# Patient Record
Sex: MALE | Race: WHITE | Employment: PART TIME | URBAN - METROPOLITAN AREA
[De-identification: names, ages, dates, MRNs, and addresses within clinical notes are randomized per-mention and may not be internally consistent; named-entity substitution may affect disease eponyms.]

---

## 2021-06-01 ENCOUNTER — HOSPITAL ENCOUNTER (EMERGENCY)
Age: 31
Discharge: HOME | End: 2021-06-01
Attending: EMERGENCY MEDICINE
Payer: COMMERCIAL

## 2021-06-01 VITALS
SYSTOLIC BLOOD PRESSURE: 136 MMHG | DIASTOLIC BLOOD PRESSURE: 86 MMHG | HEART RATE: 73 BPM | RESPIRATION RATE: 18 BRPM | BODY MASS INDEX: 29.52 KG/M2 | TEMPERATURE: 97.9 F | HEIGHT: 74 IN | OXYGEN SATURATION: 98 % | WEIGHT: 230 LBS

## 2021-06-01 DIAGNOSIS — M54.41 ACUTE RIGHT-SIDED LOW BACK PAIN WITH RIGHT-SIDED SCIATICA: Primary | ICD-10-CM

## 2021-06-01 PROCEDURE — 74011250637 HC RX REV CODE- 250/637: Performed by: EMERGENCY MEDICINE

## 2021-06-01 PROCEDURE — 74011250636 HC RX REV CODE- 250/636: Performed by: EMERGENCY MEDICINE

## 2021-06-01 PROCEDURE — 96372 THER/PROPH/DIAG INJ SC/IM: CPT

## 2021-06-01 PROCEDURE — 99283 EMERGENCY DEPT VISIT LOW MDM: CPT

## 2021-06-01 RX ORDER — OXYCODONE AND ACETAMINOPHEN 5; 325 MG/1; MG/1
1 TABLET ORAL
Qty: 4 TABLET | Refills: 0 | Status: SHIPPED | OUTPATIENT
Start: 2021-06-01 | End: 2021-06-04

## 2021-06-01 RX ORDER — IBUPROFEN 800 MG/1
800 TABLET ORAL EVERY 8 HOURS
Qty: 15 TABLET | Refills: 0 | Status: SHIPPED | OUTPATIENT
Start: 2021-06-01 | End: 2021-06-06

## 2021-06-01 RX ORDER — KETOROLAC TROMETHAMINE 15 MG/ML
15 INJECTION, SOLUTION INTRAMUSCULAR; INTRAVENOUS
Status: COMPLETED | OUTPATIENT
Start: 2021-06-01 | End: 2021-06-01

## 2021-06-01 RX ORDER — OXYCODONE AND ACETAMINOPHEN 5; 325 MG/1; MG/1
1 TABLET ORAL
Status: COMPLETED | OUTPATIENT
Start: 2021-06-01 | End: 2021-06-01

## 2021-06-01 RX ORDER — METAXALONE 800 MG/1
800 TABLET ORAL 4 TIMES DAILY
Qty: 20 TABLET | Refills: 0 | Status: SHIPPED | OUTPATIENT
Start: 2021-06-01 | End: 2021-06-06

## 2021-06-01 RX ADMIN — OXYCODONE HYDROCHLORIDE AND ACETAMINOPHEN 1 TABLET: 5; 325 TABLET ORAL at 12:36

## 2021-06-01 RX ADMIN — KETOROLAC TROMETHAMINE 15 MG: 15 INJECTION, SOLUTION INTRAMUSCULAR; INTRAVENOUS at 12:36

## 2021-06-01 NOTE — PROGRESS NOTES
Ketorolac 15 mg was therapeutically interchanged for Ketorolac 30 mg per the P &T Committee approved Therapeutic Interchanges Policy.

## 2021-06-01 NOTE — ED PROVIDER NOTES
EMERGENCY DEPARTMENT HISTORY AND PHYSICAL EXAM    Date: 6/1/2021  Patient Name: Liza Huntley    History of Presenting Illness     Chief Complaint   Patient presents with    Back Pain         History Provided By: Patient    Additional History (Context): Liza Huntley is a 35 y.o. male with No significant past medical history who presents with lanes of lower right back pain slightly radiating to his right leg. He says that he overexerted himself while cutting the grass yesterday. Denies direct trauma fever rash IVDU saddle anesthesia bowel incontinence urinary retention or unintentional weight loss in the past 6 months. He has had back pain issues previously but none to this degree where he felt it was hard to walk. He called 911. PCP: Dorethia Hammans, MD    Current Facility-Administered Medications   Medication Dose Route Frequency Provider Last Rate Last Admin    ketorolac tromethamine (TORADOL) 60 mg/2 mL injection 30 mg  30 mg IntraMUSCular NOW Micki Oliveira PA        oxyCODONE-acetaminophen (PERCOCET) 5-325 mg per tablet 1 Tablet  1 Tablet Oral NOW Micki Oliveira PA         Current Outpatient Medications   Medication Sig Dispense Refill    metaxalone (Skelaxin) 800 mg tablet Take 1 Tablet by mouth four (4) times daily for 5 days. 20 Tablet 0    ibuprofen (MOTRIN) 800 mg tablet Take 1 Tablet by mouth every eight (8) hours for 5 days. 15 Tablet 0    oxyCODONE-acetaminophen (Percocet) 5-325 mg per tablet Take 1 Tablet by mouth every six (6) hours as needed for Pain for up to 3 days. Max Daily Amount: 4 Tablets. 4 Tablet 0    trimethoprim-sulfamethoxazole (BACTRIM DS, SEPTRA DS) 160-800 mg per tablet Take 1 Tab by mouth two (2) times a day. 14 Tab 0    sertraline (ZOLOFT) 100 mg tablet Take 200 mg by mouth daily.          Past History     Past Medical History:  Past Medical History:   Diagnosis Date    ADD (attention deficit disorder with hyperactivity)     OCD (obsessive compulsive disorder)     Proteinuria        Past Surgical History:  Past Surgical History:   Procedure Laterality Date    HX WISDOM TEETH EXTRACTION  2009       Family History:  Family History   Problem Relation Age of Onset    Other Father         PVCs   [employer] Sister     Neuropathy Father        Social History:  Social History     Tobacco Use    Smoking status: Never Smoker    Smokeless tobacco: Never Used   Substance Use Topics    Alcohol use: Yes     Comment: once a month    Drug use: No       Allergies:  No Known Allergies      Review of Systems   Review of Systems   Constitutional: Negative for fever and unexpected weight change. Gastrointestinal: Negative for abdominal pain. Musculoskeletal: Positive for back pain and gait problem. Skin: Negative for rash. Neurological: Negative for weakness and numbness. All Other Systems Negative  Physical Exam     Vitals:    06/01/21 1118 06/01/21 1125   BP: 136/86    Pulse: 73    Resp: 18    Temp: 97.9 °F (36.6 °C)    SpO2: 98% 98%   Weight: 104.3 kg (230 lb)    Height: 6' 2\" (1.88 m)      Physical Exam  Vitals and nursing note reviewed. Constitutional:       General: He is not in acute distress. Appearance: He is well-developed. He is not ill-appearing, toxic-appearing or diaphoretic. HENT:      Head: Normocephalic and atraumatic. Neck:      Thyroid: No thyromegaly. Vascular: No carotid bruit. Trachea: No tracheal deviation. Cardiovascular:      Rate and Rhythm: Normal rate and regular rhythm. Heart sounds: Normal heart sounds. No murmur heard. No friction rub. No gallop. Pulmonary:      Effort: Pulmonary effort is normal. No respiratory distress. Breath sounds: Normal breath sounds. No stridor. No wheezing or rales. Chest:      Chest wall: No tenderness. Abdominal:      General: There is no distension. Palpations: Abdomen is soft. There is no mass. Tenderness: There is no abdominal tenderness.  There is no guarding or rebound. Comments: No pulsatile mass palpated. Musculoskeletal:         General: Tenderness present. Normal range of motion. Cervical back: Normal range of motion and neck supple. Comments: No midline thoracolumbar spine TTP. Negative SLE. Diffuse right latissimus dorsi distribution TTP. Skin:     General: Skin is warm and dry. Coloration: Skin is not pale. Neurological:      Mental Status: He is alert. Psychiatric:         Speech: Speech normal.         Behavior: Behavior normal.         Thought Content: Thought content normal.         Judgment: Judgment normal.              Diagnostic Study Results     Labs -   No results found for this or any previous visit (from the past 12 hour(s)). Radiologic Studies -   No orders to display     CT Results  (Last 48 hours)    None        CXR Results  (Last 48 hours)    None            Medical Decision Making   I am the first provider for this patient. I reviewed the vital signs, available nursing notes, past medical history, past surgical history, family history and social history. Vital Signs-Reviewed the patient's vital signs. Procedures:  Procedures    Provider Notes (Medical Decision Making): Treat his low back strain ambulate and discharged with PCP follow-up. MED RECONCILIATION:  Current Facility-Administered Medications   Medication Dose Route Frequency    ketorolac tromethamine (TORADOL) 60 mg/2 mL injection 30 mg  30 mg IntraMUSCular NOW    oxyCODONE-acetaminophen (PERCOCET) 5-325 mg per tablet 1 Tablet  1 Tablet Oral NOW     Current Outpatient Medications   Medication Sig    metaxalone (Skelaxin) 800 mg tablet Take 1 Tablet by mouth four (4) times daily for 5 days.  ibuprofen (MOTRIN) 800 mg tablet Take 1 Tablet by mouth every eight (8) hours for 5 days.  oxyCODONE-acetaminophen (Percocet) 5-325 mg per tablet Take 1 Tablet by mouth every six (6) hours as needed for Pain for up to 3 days.  Max Daily Amount: 4 Tablets.  trimethoprim-sulfamethoxazole (BACTRIM DS, SEPTRA DS) 160-800 mg per tablet Take 1 Tab by mouth two (2) times a day.  sertraline (ZOLOFT) 100 mg tablet Take 200 mg by mouth daily. Disposition:  home    DISCHARGE NOTE:   12:36 PM    Pt has been reexamined. Patient has no new complaints, changes, or physical findings. Care plan outlined and precautions discussed. Results of exam were reviewed with the patient. All medications were reviewed with the patient; will d/c home with see below. All of pt's questions and concerns were addressed. Patient was instructed and agrees to follow up with PCP, as well as to return to the ED upon further deterioration. Patient is ready to go home. Follow-up Information     Follow up With Specialties Details Why Contact Info    Griffin Trujillo MD [facility] Medicine Schedule an appointment as soon as possible for a visit today  [address]  [address] [place] [facility] EMERGENCY DEPT Emergency Medicine  If symptoms worsen return immediately [address]  [phone]          Current Discharge Medication List      START taking these medications    Details   metaxalone (Skelaxin) 800 mg tablet Take 1 Tablet by mouth four (4) times daily for 5 days. Qty: 20 Tablet, Refills: 0  Start date: 6/1/2021, End date: 6/6/2021      ibuprofen (MOTRIN) 800 mg tablet Take 1 Tablet by mouth every eight (8) hours for 5 days. Qty: 15 Tablet, Refills: 0  Start date: 6/1/2021, End date: 6/6/2021      oxyCODONE-acetaminophen (Percocet) 5-325 mg per tablet Take 1 Tablet by mouth every six (6) hours as needed for Pain for up to 3 days. Max Daily Amount: 4 Tablets. Qty: 4 Tablet, Refills: 0  Start date: 6/1/2021, End date: 6/4/2021    Associated Diagnoses: Acute right-sided low back pain with right-sided sciatica               Diagnosis     Clinical Impression:   1.  Acute right-sided low back pain with right-sided sciatica

## 2025-02-28 ENCOUNTER — TELEPHONE (OUTPATIENT)
Facility: HOSPITAL | Age: 35
End: 2025-02-28

## 2025-02-28 NOTE — TELEPHONE ENCOUNTER
PT WAS RETURNING CALL TO SCHEDULE EVAL APPT, SCHEDULED EVAL APPT 3/12 430 INFORMED PT TO ARRIVE BY 4

## 2025-03-12 ENCOUNTER — HOSPITAL ENCOUNTER (OUTPATIENT)
Facility: HOSPITAL | Age: 35
Setting detail: RECURRING SERIES
Discharge: HOME | End: 2025-03-15
Payer: COMMERCIAL

## 2025-03-12 PROCEDURE — 97110 THERAPEUTIC EXERCISES: CPT

## 2025-03-12 PROCEDURE — 97162 PT EVAL MOD COMPLEX 30 MIN: CPT

## 2025-03-12 PROCEDURE — 97535 SELF CARE MNGMENT TRAINING: CPT

## 2025-03-12 NOTE — PROGRESS NOTES
[facility]  [address] [facility] [address] [place] [address] Ph:[phone] Fx: [phone]    PLAN OF CARE/ Statement of Necessity for Physical Therapy Services           Patient name: Satya Ruvalcaba Start of Care: 3/12/2025   Referral source: Gabe Ortiz MD : 1990    Medical Diagnosis: Other low back pain [M54.59]       Onset Date: 12/15/2024   Treatment Diagnosis: M54.41  LUMBAGO WITH SCIATICA, RIGHT SIDE                                     Prior Hospitalization: see medical history Provider#: [id number]     Comorbidities: Fatty liver disease    Prior Level of Function: The patient reports that he was able to walk faster, sit for greater periods of time, and perform ADLs without as much limitation.    The Plan of Care and following information is based on the information from the initial evaluation.    Assessment / key information:  The patient is a 34 year old male with a chief complaint of low back pain that began 12/15/2024 when he had severe low back pain which gradually got better, but has turned into right sciatica pain which continues to this day. He describes most pain in sitting and states that walking fast is difficulty. The patient had radiographs which were negative, and has an upcoming MRI. Per exam, the patient has + slump, + SLR, 0+ achilles reflex right, 2+ achilles left, 0/5 MMT plantarflexion strength right, 5/5 MMT left, and weakness of glute med/max greater on his right than left. The patient appears to respond to repeated extension patterns and has signs and symptoms that appear consistent with an S1 radiculopathy with probable discogenic origin. He has impairments consisting of pain, decreased ROM, decreased flexibility, decreased strength, limited ADL ease, and decreased quality of life. The patient will benefit from skilled PT in order to address the aforementioned impairments.      Evaluation Complexity:  History:  MEDIUM

## 2025-03-12 NOTE — PROGRESS NOTES
PHYSICAL / OCCUPATIONAL THERAPY - DAILY TREATMENT NOTE     Patient Name: Satya Ruvalcaba    Date: 3/12/2025    : 1990  Insurance[place] / Product Type: *No Product type* /      Patient  verified Yes     Visit #   Current / Total 1 24   Time   In / Out 4:30 5:10   Pain   In / Out 2/10 1-2/10   Subjective Functional Status/Changes: See IE   Changes to:  Allergies, Med Hx, Sx Hx?   no       TREATMENT AREA =  Other low back pain [M54.59]    If an interpreting service is utilized for treatment of this patient, the contents of this document represent the material reviewed with the patient via the .     OBJECTIVE  Therapeutic Procedures:  Tx Min Billable or 1:1 Min (if diff from Tx Min) Procedure, Rationale, Specifics   15  83822 Therapeutic Exercise (timed):  increase ROM, strength, coordination, balance, and proprioception to improve patient's ability to progress to PLOF and address remaining functional goals. (see flow sheet as applicable)    Details if applicable:       8  00113 Self Care/Home Management (timed):  improve patient knowledge and understanding of home injury/symptom/pain management, positioning, posture/ergonomics, home safety, activity modification, transfer techniques, and joint protection strategies  to improve patient's ability to progress to PLOF and address remaining functional goals.  (see flow sheet as applicable)    Details if applicable:  education regarding related anatomy, education concerning there-ex rationale and how this pertains to discogenic patterns, seated posture with towel roll education.   23  [facility] Totals Reminder: bill using total billable min of TIMED therapeutic procedures (example: do not include dry needle or estim unattended, both untimed codes, in totals to left)  8-22 min = 1 unit; 23-37 min = 2 units; 38-52 min = 3 units; 53-67 min = 4 units; 68-82 min = 5 units   Total Total     TOTAL TREATMENT TIME:        23     [x]

## 2025-03-25 ENCOUNTER — HOSPITAL ENCOUNTER (OUTPATIENT)
Facility: HOSPITAL | Age: 35
Setting detail: RECURRING SERIES
Discharge: HOME | End: 2025-03-28
Payer: COMMERCIAL

## 2025-03-25 PROCEDURE — 97112 NEUROMUSCULAR REEDUCATION: CPT

## 2025-03-25 PROCEDURE — 97110 THERAPEUTIC EXERCISES: CPT

## 2025-03-25 PROCEDURE — 97012 MECHANICAL TRACTION THERAPY: CPT

## 2025-03-25 PROCEDURE — G0283 ELEC STIM OTHER THAN WOUND: HCPCS

## 2025-04-01 ENCOUNTER — HOSPITAL ENCOUNTER (OUTPATIENT)
Facility: HOSPITAL | Age: 35
Setting detail: RECURRING SERIES
Discharge: HOME | End: 2025-04-04
Payer: COMMERCIAL

## 2025-04-01 PROCEDURE — 97110 THERAPEUTIC EXERCISES: CPT

## 2025-04-01 PROCEDURE — 97012 MECHANICAL TRACTION THERAPY: CPT

## 2025-04-01 PROCEDURE — 97112 NEUROMUSCULAR REEDUCATION: CPT

## 2025-04-01 PROCEDURE — G0283 ELEC STIM OTHER THAN WOUND: HCPCS

## 2025-04-01 NOTE — PROGRESS NOTES
PHYSICAL / OCCUPATIONAL THERAPY - DAILY TREATMENT NOTE     Patient Name: Satya Ruvalcaba    Date: 2025    : 1990  [place]urance: [place] / Product Type: *No Product type* /      Patient  verified Yes     Visit #   Current / Total 3 24   Time   In / Out 5:51 6:43   Pain   In / Out 4/10 2/10   Subjective Functional Status/Changes: Reports feeling his Right LE was stiffer after previous treatment than his back.   Changes to:  Allergies, Med Hx, Sx Hx?   no       TREATMENT AREA =  Lumbago with sciatica, right side [M54.41]    If an interpreting service is utilized for treatment of this patient, the contents of this document represent the material reviewed with the patient via the .     OBJECTIVE    Modalities Rationale:     decrease pain, increase tissue extensibility, and increase muscle contraction/control to improve patient's ability to progress to PLOF and address remaining functional goals.    10 min [x] Estim Unattended, type/location: Kenyan 4\"/12\" Right gastroc - Pt prone performing ankle PF.                                     []  w/ice    []  w/heat    min [] Estim Attended, type/location:                                     []  w/US     []  w/ice    []  w/heat    []  TENS insruct     10 min [x]  Mechanical Traction: type/lbs L/S - 75#                   [x]  pro   []  sup   []  int   [x]  cont    []  before manual    []  after manual    min []  Ultrasound, settings/location:      min []  Iontophoresis w/ dexamethasone, location:                                               []  take home patch       []  in clinic        min  unbilled []  Ice     []  Heat    location/position:     min []  Paraffin,  details:     min []  Vasopneumatic Device, press/temp:     min []  Whirlpool / Fluido:    If using vaso (only need to measure limb vaso being performed on)      pre-treatment girth :       post-treatment girth :       measured at (landmark location) :      min []  Other:

## 2025-04-03 ENCOUNTER — HOSPITAL ENCOUNTER (OUTPATIENT)
Facility: HOSPITAL | Age: 35
Setting detail: RECURRING SERIES
Discharge: HOME | End: 2025-04-06
Payer: COMMERCIAL

## 2025-04-03 PROCEDURE — 97112 NEUROMUSCULAR REEDUCATION: CPT

## 2025-04-03 PROCEDURE — G0283 ELEC STIM OTHER THAN WOUND: HCPCS

## 2025-04-03 PROCEDURE — 97012 MECHANICAL TRACTION THERAPY: CPT

## 2025-04-03 PROCEDURE — 97110 THERAPEUTIC EXERCISES: CPT

## 2025-04-03 NOTE — PROGRESS NOTES
PHYSICAL / OCCUPATIONAL THERAPY - DAILY TREATMENT NOTE     Patient Name: Satya Ruvalcaba    Date: 4/3/2025    : 1990  [place]urance: [place] / Product Type: *No Product type* /      Patient  verified Yes     Visit #   Current / Total 4 24   Time   In / Out 3:10 4:00   Pain   In / Out 1/10 1/10   Subjective Functional Status/Changes: Reports less pain today.   Changes to:  Allergies, Med Hx, Sx Hx?   no       TREATMENT AREA =  Lumbago with sciatica, right side [M54.41]    If an interpreting service is utilized for treatment of this patient, the contents of this document represent the material reviewed with the patient via the .     OBJECTIVE    Modalities Rationale:     decrease pain, increase tissue extensibility, and increase muscle contraction/control to improve patient's ability to progress to PLOF and address remaining functional goals.    8 min [x] Estim Unattended, type/location: Japanese 5\"/5\" Right gastroc - Pt prone performing ankle PF.                                      []  w/ice    []  w/heat    min [] Estim Attended, type/location:                                     []  w/US     []  w/ice    []  w/heat    []  TENS insruct     10 min [x]  Mechanical Traction: type/lbs L/S - 75#                   [x]  pro   []  sup   []  int   [x]  cont    []  before manual    []  after manual    min []  Ultrasound, settings/location:      min []  Iontophoresis w/ dexamethasone, location:                                               []  take home patch       []  in clinic        min  unbilled []  Ice     []  Heat    location/position:     min []  Paraffin,  details:     min []  Vasopneumatic Device, press/temp:     min []  Whirlpool / Fluido:    If using vaso (only need to measure limb vaso being performed on)      pre-treatment girth :       post-treatment girth :       measured at (landmark location) :      min []  Other:    Skin assessment post-treatment (if applicable):    []

## 2025-04-08 ENCOUNTER — TELEPHONE (OUTPATIENT)
Facility: HOSPITAL | Age: 35
End: 2025-04-08

## 2025-04-08 ENCOUNTER — HOSPITAL ENCOUNTER (OUTPATIENT)
Facility: HOSPITAL | Age: 35
Setting detail: RECURRING SERIES
End: 2025-04-08
Payer: COMMERCIAL

## 2025-04-10 ENCOUNTER — HOSPITAL ENCOUNTER (OUTPATIENT)
Facility: HOSPITAL | Age: 35
Setting detail: RECURRING SERIES
Discharge: HOME | End: 2025-04-13
Payer: COMMERCIAL

## 2025-04-10 PROCEDURE — G0283 ELEC STIM OTHER THAN WOUND: HCPCS

## 2025-04-10 PROCEDURE — 97012 MECHANICAL TRACTION THERAPY: CPT

## 2025-04-10 PROCEDURE — 97112 NEUROMUSCULAR REEDUCATION: CPT

## 2025-04-10 PROCEDURE — 97110 THERAPEUTIC EXERCISES: CPT

## 2025-04-10 NOTE — PROGRESS NOTES
[facility]  [address] [place] [address] - Ph: [phone]   Fx: [phone]    PHYSICAL THERAPY PROGRESS NOTE      Patient name: Satya Ruvalcaba Start of Care: 3/12/2025   Referral source: Gabe Ortiz MD : 1990    Medical Diagnosis: Other low back pain  Sciatica, right side  [place] / [place] / Product Type: *No Product type* /  Onset Date:12/15/2024    Treatment Diagnosis: M54.41  LUMBAGO WITH SCIATICA, RIGHT SIDE     Prior Hospitalization: see medical history Provider#: [id number]   Comorbidities: Fatty liver disease   Prior Level of Function:The patient reports that he was able to walk faster, sit for greater periods of time, and perform ADLs without as much limitation.     Reporting Period: 3/12/2025-4/10/2025  Visits from Start of Care: 5    Missed Visits: 1    Goals/Measure of Progress: To be achieved in 24 treatments:    Short Term Goals: To be accomplished in 2 weeks  The patient will demonstrate independence and compliance with HEP to maximize therapeutic benefit.              IE: issued HEP              PN 3/25/2025: Met, pt reports compliance.  The patient will demonstrate 90/90 flexibility of right to 30 degrees to improve ease of ambulation.              IE: 62 degrees right              PN 2025: Progressing, lacking 50 degrees.     Long Term Goals: To be accomplished in 12 weeks  The patient will improve Oswestry score to < 10% disability to improve ease of ADLs.              IE: 34%              PN 4/10/2025: 18%  The patient will demonstrate plantarflexion strength of right to 4/5 MMT to improve ease of ambulation efficiency.              IE: 0/5 MMT              PN 4/3/2025: Progressing, 3-/5.  The patient will demonstrate hip ABD/EXT strength to 5/5 MMT right hip to improve stability in stance.              IE: 4-/5 MMT glute med, and max              PN 4/10/2025: MMT Right hip abd 4/5, ext

## 2025-04-10 NOTE — PROGRESS NOTES
PHYSICAL / OCCUPATIONAL THERAPY - DAILY TREATMENT NOTE     Patient Name: Satya Ruvalcaba    Date: 4/10/2025    : 1990  Insurance: [place] / [place] / Product Type: *No Product type* /      Patient  verified Yes     Visit #   Current / Total 5 24   Time   In / Out 3:46 4:42   Pain   In / Out 1/10 1/10   Subjective Functional Status/Changes: \"Doing well overall.\"   Changes to:  Allergies, Med Hx, Sx Hx?   no       TREATMENT AREA =  Lumbago with sciatica, right side [M54.41]    If an interpreting service is utilized for treatment of this patient, the contents of this document represent the material reviewed with the patient via the .     OBJECTIVE    Modalities Rationale:     decrease pain, increase tissue extensibility, and increase muscle contraction/control to improve patient's ability to progress to PLOF and address remaining functional goals.    8 min [x] Estim Unattended, type/location: Albanian 5\"/5\" Right gastroc - Pt prone performing ankle PF.                                      []  w/ice    []  w/heat    min [] Estim Attended, type/location:                                     []  w/US     []  w/ice    []  w/heat    []  TENS insruct     10 min [x]  Mechanical Traction: type/lbs L/S - 75#                   [x]  pro   []  sup   []  int   [x]  cont    []  before manual    []  after manual    min []  Ultrasound, settings/location:      min []  Iontophoresis w/ dexamethasone, location:                                               []  take home patch       []  in clinic        min  unbilled []  Ice     []  Heat    location/position:     min []  Paraffin,  details:     min []  Vasopneumatic Device, press/temp:     min []  Whirlpool / Fluido:    If using vaso (only need to measure limb vaso being performed on)      pre-treatment girth :       post-treatment girth :       measured at (landmark location) :      min []  Other:    Skin assessment post-treatment (if applicable):    []

## 2025-04-15 ENCOUNTER — TELEPHONE (OUTPATIENT)
Facility: HOSPITAL | Age: 35
End: 2025-04-15

## 2025-04-15 ENCOUNTER — HOSPITAL ENCOUNTER (OUTPATIENT)
Facility: HOSPITAL | Age: 35
Setting detail: RECURRING SERIES
Discharge: HOME | End: 2025-04-18
Payer: COMMERCIAL

## 2025-04-15 PROCEDURE — G0283 ELEC STIM OTHER THAN WOUND: HCPCS

## 2025-04-15 PROCEDURE — 97110 THERAPEUTIC EXERCISES: CPT

## 2025-04-15 PROCEDURE — 97112 NEUROMUSCULAR REEDUCATION: CPT

## 2025-04-15 PROCEDURE — 97012 MECHANICAL TRACTION THERAPY: CPT

## 2025-04-15 NOTE — PROGRESS NOTES
[facility]   5/1/2025  4:30 PM [facility] Richmond, [facility]   5/6/2025  5:50 PM Ganesh Richmond, LUISITO [facility]   5/8/2025  4:30 PM Ganesh Richmond, PTA [facility]   5/13/2025  5:10 PM Ganesh Richmond, [facility]

## 2025-04-17 ENCOUNTER — HOSPITAL ENCOUNTER (OUTPATIENT)
Facility: HOSPITAL | Age: 35
Setting detail: RECURRING SERIES
Discharge: HOME | End: 2025-04-20
Payer: COMMERCIAL

## 2025-04-17 PROCEDURE — 97012 MECHANICAL TRACTION THERAPY: CPT

## 2025-04-17 PROCEDURE — G0283 ELEC STIM OTHER THAN WOUND: HCPCS

## 2025-04-17 PROCEDURE — 97110 THERAPEUTIC EXERCISES: CPT

## 2025-04-17 PROCEDURE — 97112 NEUROMUSCULAR REEDUCATION: CPT

## 2025-04-17 NOTE — PROGRESS NOTES
Other:    Skin assessment post-treatment (if applicable):    []  intact    []  redness- no adverse reaction                 []redness - adverse reaction:         Therapeutic Procedures:  Tx Min Billable or 1:1 Min (if diff from Tx Min) Procedure, Rationale, Specifics   22  23176 Therapeutic Exercise (timed):  increase ROM, strength, coordination, balance, and proprioception to improve patient's ability to progress to PLOF and address remaining functional goals. (see flow sheet as applicable)    Details if applicable:       10  39012 Neuromuscular Re-Education (timed):  improve balance, coordination, kinesthetic sense, posture, core stability and proprioception to improve patient's ability to develop conscious control of individual muscles and awareness of position of extremities in order to progress to PLOF and address remaining functional goals. (see flow sheet as applicable)    Details if applicable:     32  [facility] Totals Reminder: bill using total billable min of TIMED therapeutic procedures (example: do not include dry needle or estim unattended, both untimed codes, in totals to left)  8-22 min = 1 unit; 23-37 min = 2 units; 38-52 min = 3 units; 53-67 min = 4 units; 68-82 min = 5 units   Total Total     TOTAL TREATMENT TIME:        50     Charge Capture    [x]  Patient Education billed concurrently with other procedures   [x] Review HEP    [] Progressed/Changed HEP, detail:    [] Other detail:       Objective Information/Functional Measures/Assessment    MMT Right hip abd 4+/5, ext 4/5. Demonstrates improved exercise tolerance, improved stability with eccentric control. Normal gait with regular speed ambulation, mild Right foot toe off deficits with fast paced walking. Denied pain and sciatic symptoms post-treatment.    Patient will continue to benefit from skilled PT / OT services to modify and progress therapeutic interventions, analyze and address functional mobility deficits, analyze and address ROM

## 2025-04-29 ENCOUNTER — TELEPHONE (OUTPATIENT)
Facility: HOSPITAL | Age: 35
End: 2025-04-29

## 2025-04-29 ENCOUNTER — HOSPITAL ENCOUNTER (OUTPATIENT)
Facility: HOSPITAL | Age: 35
Setting detail: RECURRING SERIES
Discharge: HOME | End: 2025-05-02
Payer: COMMERCIAL

## 2025-04-29 PROCEDURE — 97110 THERAPEUTIC EXERCISES: CPT

## 2025-04-29 PROCEDURE — 97112 NEUROMUSCULAR REEDUCATION: CPT

## 2025-04-29 PROCEDURE — 97012 MECHANICAL TRACTION THERAPY: CPT

## 2025-04-29 NOTE — PROGRESS NOTES
throughout day of HEP.     Patient will continue to benefit from skilled PT / OT services to modify and progress therapeutic interventions, analyze and address functional mobility deficits, analyze and address ROM deficits, analyze and address strength deficits, analyze and address soft tissue restrictions, analyze and cue for proper movement patterns, and analyze and modify for postural abnormalities to address functional deficits and attain remaining goals.    Progress toward goals / Updated goals:  []  See Progress Note/Recertification    Short Term Goals: To be accomplished in 2 weeks  The patient will demonstrate independence and compliance with HEP to maximize therapeutic benefit.              IE: issued HEP              PN 3/25/2025: Met, pt reports compliance.  The patient will demonstrate 90/90 flexibility of right to 30 degrees to improve ease of ambulation.              IE: 62 degrees right              PN 4/1/2025: Progressing, lacking 50 degrees.     Long Term Goals: To be accomplished in 12 weeks  The patient will improve Oswestry score to < 10% disability to improve ease of ADLs.              IE: 34%              PN 4/10/2025: 18%  The patient will demonstrate plantarflexion strength of right to 4/5 MMT to improve ease of ambulation efficiency.              IE: 0/5 MMT              PN 4/29/2025: Progressing, 3+/5.  The patient will demonstrate hip ABD/EXT strength to 5/5 MMT right hip to improve stability in stance.              IE: 4-/5 MMT glute med, and max              PN 4/10/2025: MMT Right hip abd 4/5, ext 4/5.              Current 4/17/2025: MMT Right hip abd 4+/5, ext 4/5.   The patient will report 90% resolution of right leg paresthesias to improve ease of work related tasks.              IE: 0% resolution sciatica              PN 4/10/2025: 60-70% improvement.    PLAN  Yes  Continue plan of care  []  Upgrade activities as tolerated  []  Discharge due to :  []  Other:    Rhoda Mazariegos

## 2025-05-01 ENCOUNTER — APPOINTMENT (OUTPATIENT)
Facility: HOSPITAL | Age: 35
End: 2025-05-01
Payer: COMMERCIAL

## 2025-05-01 ENCOUNTER — TELEPHONE (OUTPATIENT)
Facility: HOSPITAL | Age: 35
End: 2025-05-01

## 2025-05-06 ENCOUNTER — HOSPITAL ENCOUNTER (OUTPATIENT)
Facility: HOSPITAL | Age: 35
Setting detail: RECURRING SERIES
Discharge: HOME | End: 2025-05-09
Payer: COMMERCIAL

## 2025-05-06 ENCOUNTER — TELEPHONE (OUTPATIENT)
Facility: HOSPITAL | Age: 35
End: 2025-05-06

## 2025-05-06 PROCEDURE — 97110 THERAPEUTIC EXERCISES: CPT

## 2025-05-06 PROCEDURE — G0283 ELEC STIM OTHER THAN WOUND: HCPCS

## 2025-05-06 PROCEDURE — 97112 NEUROMUSCULAR REEDUCATION: CPT

## 2025-05-06 PROCEDURE — 97012 MECHANICAL TRACTION THERAPY: CPT

## 2025-05-06 NOTE — PROGRESS NOTES
PHYSICAL / OCCUPATIONAL THERAPY - DAILY TREATMENT NOTE     Patient Name: Satya Ruvalcaba    Date: 2025    : 1990  Insurance: [place] [employer] / [place] [employer] / Product Type: *No Product type* /      Patient  verified Yes     Visit #   Current / Total 9 24   Time   In / Out 5:45 6:44   Pain   In / Out 0/10 0/10   Subjective Functional Status/Changes: Denies pain.   Changes to:  Allergies, Med Hx, Sx Hx?   no       TREATMENT AREA =  Lumbago with sciatica, right side [M54.41]    If an interpreting service is utilized for treatment of this patient, the contents of this document represent the material reviewed with the patient via the .     OBJECTIVE    Modalities Rationale:     decrease pain and increase tissue extensibility to improve patient's ability to progress to PLOF and address remaining functional goals.    8 min [x] Estim Unattended, type/location: [place] 5\"/5\" Right gastroc - Pt prone performing ankle PF.                                      []  w/ice    []  w/heat    min [] Estim Attended, type/location:                                     []  w/US     []  w/ice    []  w/heat    []  TENS insruct     10 min [x]  Mechanical Traction: type/lbs L/S - 75#                   [x]  pro   []  sup   []  int   [x]  cont    []  before manual    []  after manual    min []  Ultrasound, settings/location:      min []  Iontophoresis w/ dexamethasone, location:                                               []  take home patch       []  in clinic        min  unbilled []  Ice     []  Heat    location/position:     min []  Paraffin,  details:     min []  Vasopneumatic Device, press/temp:     min []  Whirlpool / Fluido:    If using vaso (only need to measure limb vaso being performed on)      pre-treatment girth :       post-treatment girth :       measured at (landmark location) :      min []  Other:    Skin assessment post-treatment (if applicable):    []  intact    []  redness- no adverse reaction

## 2025-05-08 ENCOUNTER — HOSPITAL ENCOUNTER (OUTPATIENT)
Facility: HOSPITAL | Age: 35
Setting detail: RECURRING SERIES
Discharge: HOME | End: 2025-05-11
Payer: COMMERCIAL

## 2025-05-08 PROCEDURE — 97110 THERAPEUTIC EXERCISES: CPT

## 2025-05-08 PROCEDURE — 97012 MECHANICAL TRACTION THERAPY: CPT

## 2025-05-08 PROCEDURE — 97112 NEUROMUSCULAR REEDUCATION: CPT

## 2025-05-12 NOTE — PROGRESS NOTES
[facility]  [address]  [place] [address] [phone] fax    Physical Therapy Discharge Summary  Patient name: Satya Ruvalcaba Start of Care: 3/12/2025   Referral source: Gabe Ortiz MD : 1990               Medical Diagnosis: Other low back pain [M54.59]        Onset Date: 12/15/2024   Treatment Diagnosis: M54.41  LUMBAGO WITH SCIATICA, RIGHT SIDE                                     Prior Hospitalization: see medical history Provider#: [id number]      Comorbidities: Fatty liver disease       Visits from Start of Care: 10    Missed Visits: 1    Reporting Period : 3/12/2025 to 2025    Goals/Measure of Progress:    Short Term Goals: To be accomplished in 2 weeks  The patient will demonstrate independence and compliance with HEP to maximize therapeutic benefit.              IE: issued HEP              D/C: Met, pt reports compliance.  The patient will demonstrate 90/90 flexibility of right to 30 degrees to improve ease of ambulation.              IE: 62 degrees right              PN 2025: Progressing, lacking 50 degrees.              D/C: Nearly met, lacking 40 degrees.     Long Term Goals: To be accomplished in 12 weeks  The patient will improve Oswestry score to < 10% disability to improve ease of ADLs.              IE: 34%              PN 4/10/2025: 18%              D/C: Nearly met, 12%  The patient will demonstrate plantarflexion strength of right to 4/5 MMT to improve ease of ambulation efficiency.              IE: 0/5 MMT              PN 2025: Progressing, 3+/5.              D/C: Right ankle PF strength 4-/5, fatigues quickly.   The patient will demonstrate hip ABD/EXT strength to 5/5 MMT right hip to improve stability in stance.              IE: 4-/5 MMT glute med, and max              PN 4/10/2025: MMT Right hip abd 4/5, ext 4/5.              D/C: MMT Right hip abd 4+/5, ext 4/5.   The patient will report 90% resolution 
therapeutic benefit.              IE: issued HEP              PN 3/25/2025: Met, pt reports compliance.  The patient will demonstrate 90/90 flexibility of right to 30 degrees to improve ease of ambulation.              IE: 62 degrees right              PN 4/1/2025: Progressing, lacking 50 degrees.   Current 5/8/2025: Nearly met, lacking 40 degrees.     Long Term Goals: To be accomplished in 12 weeks  The patient will improve Oswestry score to < 10% disability to improve ease of ADLs.              IE: 34%              PN 4/10/2025: 18%   Current 5/8/2025: Nearly met, 12%  The patient will demonstrate plantarflexion strength of right to 4/5 MMT to improve ease of ambulation efficiency.              IE: 0/5 MMT              PN 4/29/2025: Progressing, 3+/5.   Current 5/8/2025: Right ankle PF strength 4-/5, fatigues quickly.   The patient will demonstrate hip ABD/EXT strength to 5/5 MMT right hip to improve stability in stance.              IE: 4-/5 MMT glute med, and max              PN 4/10/2025: MMT Right hip abd 4/5, ext 4/5.              Current 4/17/2025: MMT Right hip abd 4+/5, ext 4/5.   The patient will report 90% resolution of right leg paresthesias to improve ease of work related tasks.              IE: 0% resolution sciatica              PN 4/10/2025: 60-70% improvement.              Current 5/6/2025: Reports 90% improvement on good days and ~75% on bad days regarding Right LE paresthesia.    PLAN  No  Continue plan of care  []  Upgrade activities as tolerated  [x]  Discharge due to : Good progress towards goals. Pt wishes to D/C and continue with HEP at this time.  []  Other:    Ganesh Richmond PTA    5/8/2025    4:25 PM    Future Appointments   Date Time Provider Department Center   5/8/2025  4:30 PM [facility]   5/13/2025  5:10 PM [facility] Richmond [facility]

## 2025-05-13 ENCOUNTER — APPOINTMENT (OUTPATIENT)
Facility: HOSPITAL | Age: 35
End: 2025-05-13
Payer: COMMERCIAL